# Patient Record
Sex: FEMALE | Race: WHITE | NOT HISPANIC OR LATINO | Employment: FULL TIME | URBAN - METROPOLITAN AREA
[De-identification: names, ages, dates, MRNs, and addresses within clinical notes are randomized per-mention and may not be internally consistent; named-entity substitution may affect disease eponyms.]

---

## 2023-05-05 ENCOUNTER — APPOINTMENT (OUTPATIENT)
Dept: PRIMARY CARE | Facility: CLINIC | Age: 68
End: 2023-05-05
Payer: MEDICARE

## 2024-08-12 ENCOUNTER — APPOINTMENT (OUTPATIENT)
Dept: OTOLARYNGOLOGY | Facility: CLINIC | Age: 69
End: 2024-08-12
Payer: MEDICARE

## 2024-08-12 DIAGNOSIS — H61.21 IMPACTED CERUMEN OF RIGHT EAR: Primary | ICD-10-CM

## 2024-08-12 PROCEDURE — 69210 REMOVE IMPACTED EAR WAX UNI: CPT | Performed by: OTOLARYNGOLOGY

## 2024-08-12 PROCEDURE — 99203 OFFICE O/P NEW LOW 30 MIN: CPT | Performed by: OTOLARYNGOLOGY

## 2024-08-12 ASSESSMENT — ENCOUNTER SYMPTOMS
DEPRESSION: 0
LOSS OF SENSATION IN FEET: 0
OCCASIONAL FEELINGS OF UNSTEADINESS: 0

## 2024-08-12 NOTE — PROGRESS NOTES
"History Of Present Illness  Amanda Vee is a 69 y.o. female presenting with: \"Right ear-wax buildup\".  She is kindly referred by Dr. Ghassan Carvalho MD     She has decreased hearing in her right ear for about a month.  On examination, there was wax in right ear canal. Cleaning was doen. TMS look intact. 512 cps cao midline, rinne +/+ bilaterally.    Plan:  1- follow up as needed.     Past Medical History  She has no past medical history on file.    Surgical History  She has no past surgical history on file.     Social History  She has no history on file for tobacco use, alcohol use, and drug use.    Family History  No family history on file.     Allergies  Patient has no allergy information on record.    Review of Systems   Difficulty hearing     Physical Exam    General appearance: Healthy-appearing, well-nourished, well groomed, in no acute distress.     Head and Face: Atraumatic with no masses, lesions, or scarring.      Facial strength: Normal strength and symmetry, no synkinesis or facial tic.     Eyes: Conjunctivas look non-hyperemic bilaterally    Ears: Wax was cleaned from right ear canal. Bilaterally ear canals look normal. Tympanic membranes look intact, no hyperemia, fluid or retraction. Hearing grossly normal.      Nose: Mucosa looks normal. No purulent discharge.      Oral Cavity/Mouth: Lips and tongue look normal.     Throat: No postnasal discharge. No tonsil hypertrophy. No hyperemia.    Neck: Looks symmetrical.     Pulmonary: Normal respiratory effort.     Neurological/Psychiatric Orientation to person, place, and time: Normal.     Mood and affect: Normal.      Extremities: No clubbing.     Skin: No significant skin lesions were noted at face or neck        Procedure  EAR WAX REMOVAL 08.12.2024  Patient had right ear wax. Using small instrument(s) and/or suction cleaning was done. Patient tolerated the procedure well.          Last Recorded Vitals  There were no vitals taken for this " "visit.    Relevant Results    Assessment and Plan:  Amanda Vee is a 69 y.o. female presenting with: \"Right ear-wax buildup\".  She is kindly referred by Dr. Ghassan Carvalho MD     She has decreased hearing in her right ear for about a month.  On examination, there was wax in right ear canal. Cleaning was doen. TMS look intact. 512 cps cao midline, rinne +/+ bilaterally.    Plan:  1- follow up as needed.     Johnson Seay  Otolaryngology - Head & Neck Surgery  "

## 2024-08-26 ENCOUNTER — APPOINTMENT (OUTPATIENT)
Dept: OTOLARYNGOLOGY | Facility: CLINIC | Age: 69
End: 2024-08-26
Payer: MEDICARE